# Patient Record
Sex: MALE | Race: WHITE | NOT HISPANIC OR LATINO | Employment: FULL TIME | URBAN - METROPOLITAN AREA
[De-identification: names, ages, dates, MRNs, and addresses within clinical notes are randomized per-mention and may not be internally consistent; named-entity substitution may affect disease eponyms.]

---

## 2019-09-02 ENCOUNTER — HOSPITAL ENCOUNTER (EMERGENCY)
Facility: HOSPITAL | Age: 24
Discharge: LEFT BEFORE DISCHARGE | End: 2019-09-03
Attending: EMERGENCY MEDICINE
Payer: COMMERCIAL

## 2019-09-02 DIAGNOSIS — F11.11 H/O OPIOID ABUSE (CMS/HCC): Primary | ICD-10-CM

## 2019-09-02 LAB
ALBUMIN SERPL-MCNC: 3.3 G/DL (ref 3.4–5)
ALP SERPL-CCNC: 89 IU/L (ref 35–126)
ALT SERPL-CCNC: 27 IU/L (ref 16–63)
ANION GAP SERPL CALC-SCNC: 5 MEQ/L (ref 3–15)
APAP SERPL-MCNC: <10 UG/ML (ref 10–30)
AST SERPL-CCNC: 26 IU/L (ref 15–41)
BASOPHILS # BLD: 0.02 K/UL (ref 0.01–0.1)
BASOPHILS NFR BLD: 0.2 %
BILIRUB SERPL-MCNC: 0.3 MG/DL (ref 0.3–1.2)
BUN SERPL-MCNC: 15 MG/DL (ref 8–20)
CALCIUM SERPL-MCNC: 8.5 MG/DL (ref 8.9–10.3)
CHLORIDE SERPL-SCNC: 101 MEQ/L (ref 98–109)
CO2 SERPL-SCNC: 31 MEQ/L (ref 22–32)
CREAT SERPL-MCNC: 0.9 MG/DL
DIFFERENTIAL METHOD BLD: NORMAL
EOSINOPHIL # BLD: 0.29 K/UL (ref 0.04–0.54)
EOSINOPHIL NFR BLD: 3.3 %
ERYTHROCYTE [DISTWIDTH] IN BLOOD BY AUTOMATED COUNT: 13.4 % (ref 11.6–14.4)
ETHANOL SERPL-MCNC: <5 MG/DL
GFR SERPL CREATININE-BSD FRML MDRD: >60 ML/MIN/1.73M*2
GLUCOSE SERPL-MCNC: 144 MG/DL (ref 70–99)
HCT VFR BLDCO AUTO: 34.4 %
HGB BLD-MCNC: 10.5 G/DL
IMM GRANULOCYTES # BLD AUTO: 0.02 K/UL (ref 0–0.08)
IMM GRANULOCYTES NFR BLD AUTO: 0.2 %
LYMPHOCYTES # BLD: 1.8 K/UL (ref 1.2–3.5)
LYMPHOCYTES NFR BLD: 20.5 %
MCH RBC QN AUTO: 26.3 PG (ref 28–33.2)
MCHC RBC AUTO-ENTMCNC: 30.5 G/DL (ref 32.2–36.5)
MCV RBC AUTO: 86.2 FL (ref 83–98)
MONOCYTES # BLD: 0.7 K/UL (ref 0.3–1)
MONOCYTES NFR BLD: 8 %
NEUTROPHILS # BLD: 5.94 K/UL (ref 1.7–7)
NEUTS SEG NFR BLD: 67.8 %
NRBC BLD-RTO: 0 %
PDW BLD AUTO: 9 FL (ref 9.4–12.4)
PLATELET # BLD AUTO: 255 K/UL
POTASSIUM SERPL-SCNC: 3.7 MEQ/L (ref 3.6–5.1)
PROT SERPL-MCNC: 7.2 G/DL (ref 6–8.2)
RBC # BLD AUTO: 3.99 M/UL (ref 4.5–5.8)
SALICYLATES SERPL-MCNC: <4 MG/DL
SODIUM SERPL-SCNC: 137 MEQ/L (ref 136–144)
WBC # BLD AUTO: 8.77 K/UL

## 2019-09-02 PROCEDURE — 85025 COMPLETE CBC W/AUTO DIFF WBC: CPT | Performed by: EMERGENCY MEDICINE

## 2019-09-02 PROCEDURE — 93005 ELECTROCARDIOGRAM TRACING: CPT | Performed by: EMERGENCY MEDICINE

## 2019-09-02 PROCEDURE — 99284 EMERGENCY DEPT VISIT MOD MDM: CPT | Mod: 25

## 2019-09-02 PROCEDURE — 80053 COMPREHEN METABOLIC PANEL: CPT | Performed by: EMERGENCY MEDICINE

## 2019-09-02 PROCEDURE — 36415 COLL VENOUS BLD VENIPUNCTURE: CPT | Performed by: EMERGENCY MEDICINE

## 2019-09-02 PROCEDURE — G0480 DRUG TEST DEF 1-7 CLASSES: HCPCS | Performed by: EMERGENCY MEDICINE

## 2019-09-03 VITALS
TEMPERATURE: 98.6 F | DIASTOLIC BLOOD PRESSURE: 58 MMHG | WEIGHT: 150.9 LBS | HEIGHT: 70 IN | BODY MASS INDEX: 21.6 KG/M2 | RESPIRATION RATE: 15 BRPM | SYSTOLIC BLOOD PRESSURE: 115 MMHG | OXYGEN SATURATION: 98 % | HEART RATE: 60 BPM

## 2019-09-03 LAB
AMPHET UR QL SCN: NORMAL
ATRIAL RATE: 48
BACTERIA URNS QL MICRO: NORMAL /HPF
BARBITURATES UR QL SCN: NORMAL
BENZODIAZ UR QL SCN: NORMAL
BILIRUB UR QL STRIP.AUTO: NEGATIVE MG/DL
CANNABINOIDS UR QL SCN: NORMAL
CLARITY UR REFRACT.AUTO: ABNORMAL
COCAINE UR QL SCN: NORMAL
COLOR UR AUTO: YELLOW
GLUCOSE UR STRIP.AUTO-MCNC: NEGATIVE MG/DL
HGB UR QL STRIP.AUTO: NEGATIVE
HYALINE CASTS #/AREA URNS LPF: NORMAL /LPF
KETONES UR STRIP.AUTO-MCNC: NEGATIVE MG/DL
LEUKOCYTE ESTERASE UR QL STRIP.AUTO: NEGATIVE
NITRITE UR QL STRIP.AUTO: NEGATIVE
OPIATES UR QL SCN: NORMAL
P AXIS: 38
PCP UR QL SCN: NORMAL
PH UR STRIP.AUTO: 7 [PH]
PR INTERVAL: 158
PROT UR QL STRIP.AUTO: NEGATIVE
QRS DURATION: 90
QT INTERVAL: 480
QTC CALCULATION(BAZETT): 428
R AXIS: 24
RBC #/AREA URNS HPF: NORMAL /HPF
SP GR UR REFRACT.AUTO: 1.01
SQUAMOUS URNS QL MICRO: NORMAL /HPF
T WAVE AXIS: 9
UROBILINOGEN UR STRIP-ACNC: 0.2 EU/DL
VENTRICULAR RATE: 48
WBC #/AREA URNS HPF: NORMAL /HPF

## 2019-09-03 PROCEDURE — 80307 DRUG TEST PRSMV CHEM ANLYZR: CPT | Performed by: EMERGENCY MEDICINE

## 2019-09-03 PROCEDURE — 81001 URINALYSIS AUTO W/SCOPE: CPT | Mod: 59 | Performed by: EMERGENCY MEDICINE

## 2019-09-03 PROCEDURE — 93010 ELECTROCARDIOGRAM REPORT: CPT | Performed by: INTERNAL MEDICINE

## 2019-09-03 NOTE — ED PROVIDER NOTES
"HPI     Chief Complaint   Patient presents with   • Withdrawal     pt states \"I use heroin and I'm trying to quit\"> Last use 4-5 hours ago.     Pt is a 24 year old male who presents to the ED requesting rehab.   Pt has hx of heroin abuse.   States he last used 4 hours ago by means of injection.   Pt would like to go to [facility].   Denies CP, SOB, nausea, or vomiting.   Pt denies alcohol use.   Pt has been to rehab in the past.     HPI     Patient History     History reviewed. No pertinent past medical history.    History reviewed. No pertinent surgical history.    History reviewed. No pertinent family history.    Social History   Substance Use Topics   • Smoking status: Smoker, Current Status Unknown   • Smokeless tobacco: Never Used   • Alcohol use Yes       Systems Reviewed from Nursing Triage:          Review of Systems     Review of Systems   Respiratory: Negative for chest tightness, shortness of breath and wheezing.    Cardiovascular: Negative for chest pain.   Gastrointestinal: Negative for abdominal pain, nausea and vomiting.   Neurological: Negative for dizziness.   Psychiatric/Behavioral: Negative for self-injury and suicidal ideas. The patient is nervous/anxious.         Physical Exam     ED Triage Vitals   Temp Heart Rate Resp BP SpO2   09/02/19 2117 09/02/19 2117 09/02/19 2117 09/02/19 2117 09/02/19 2117   37 °C (98.6 °F) (!) 58 16 129/68 100 %      Temp Source Heart Rate Source Patient Position BP Location FiO2 (%) (Set)   09/02/19 2117 09/02/19 2332 09/02/19 2117 09/02/19 2117 --   Oral Monitor Sitting Right upper arm        Pulse Ox %: 100 % (09/02/19 2153)  Pulse Ox Interpretation: Normal (09/02/19 2153)  Heart Rate: 58 (09/02/19 2153)  Rhythm Strip Interpretation: Sinus Bradycardia (09/02/19 2153)    Patient Vitals for the past 24 hrs:   BP Temp Temp src Pulse Resp SpO2 Height Weight   09/03/19 0028 - - - 60 - - - -   09/02/19 2332 (!) 115/58 - - 60 15 98 % - -   09/02/19 2117 129/68 37 °C (98.6 °F) " "Oral (!) 58 16 100 % 1.778 m (5' 10\") 68.4 kg (150 lb 14.4 oz)           Physical Exam   Constitutional: He is oriented to person, place, and time. He is cooperative. He is easily aroused.  Non-toxic appearance. He does not have a sickly appearance. He does not appear ill. No distress. He is not intubated.   HENT:   Head: Normocephalic and atraumatic.   Mouth/Throat: Oropharynx is clear and moist and mucous membranes are normal.   Cardiovascular: Normal rate, regular rhythm and normal heart sounds.    Pulmonary/Chest: Effort normal and breath sounds normal. No accessory muscle usage. No apnea, no tachypnea and no bradypnea. He is not intubated. No respiratory distress.   Abdominal: Soft. Normal appearance. There is no tenderness.   Neurological: He is alert, oriented to person, place, and time and easily aroused. GCS eye subscore is 4. GCS verbal subscore is 5. GCS motor subscore is 6.            Procedures    ED Course & MDM     Labs Reviewed   COMPREHENSIVE METABOLIC PANEL - Abnormal        Result Value    Sodium 137      Potassium 3.7      Chloride 101      CO2 31      BUN 15      Creatinine 0.9      Glucose 144 (*)     Calcium 8.5 (*)     AST (SGOT) 26      ALT (SGPT) 27      Alkaline Phosphatase 89      Total Protein 7.2      Albumin 3.3 (*)     Bilirubin, Total 0.3      eGFR >60.0      Anion Gap 5     ER TOXICOLOGY SCR, SERUM - Abnormal     Salicylate <4.0      Acetaminophen <10.0 (*)     Ethanol <5     CBC - Abnormal     WBC 8.77      RBC 3.99 (*)     Hemoglobin 10.5 (*)     Hematocrit 34.4 (*)     MCV 86.2      MCH 26.3 (*)     MCHC 30.5 (*)     RDW 13.4      Platelets 255      MPV 9.0 (*)    UA REFLEX CULTURE (MACROSCOPIC) - Abnormal     Color, Urine Yellow      Clarity, Urine Cloudy (*)     Specific Gravity, Urine 1.014      pH, Urine 7.0      Leukocyte Esterase Negative      Nitrite, Urine Negative      Protein, Urine Negative      Glucose, Urine Negative      Ketones, Urine Negative      Urobilinogen, " Urine 0.2      Bilirubin, Urine Negative      Blood, Urine Negative     DRUG SCREEN PANEL, URINE - Normal    PCP Scrn, Ur None Detected      Benzodiazepine Ur Qual None Detected      Cocaine Screen, Urine None Detected      Amphetamine+Methamphetamine Screen, Ur None Detected      Cannabinoid Screen, Urine None Detected      Opiate Scrn, Ur None Detected      Barbiturate Screen, Ur None Detected     UA MICROSCOPIC - Normal    RBC, Urine 0 TO 4      WBC, Urine 0 TO 3      Squamous Epithelial None Seen      Hyaline Cast None Seen      Bacteria, Urine None Seen     CBC AND DIFFERENTIAL    Narrative:     The following orders were created for panel order CBC and differential.  Procedure                               Abnormality         Status                     ---------                               -----------         ------                     CBC[[id number]]                           Abnormal            Final result               Diff Count[[id number]]                                        Final result                 Please view results for these tests on the individual orders.   URINALYSIS REFLEX CULTURE    Narrative:     The following orders were created for panel order Urinalysis with Reflex Culture.  Procedure                               Abnormality         Status                     ---------                               -----------         ------                     UA Reflex to Culture (Mac...[[id number]]  Abnormal            Final result               UA Microscopic[[id number]]                Normal              Final result                 Please view results for these tests on the individual orders.   DIFF COUNT    Differential Type Auto      nRBC 0.0      Immature Granulocytes 0.2      Neutrophils 67.8      Lymphocytes 20.5      Monocytes 8.0      Eosinophils 3.3      Basophils 0.2      Immature Granulocytes, Absolute 0.02      Neutrophils, Absolute 5.94      Lymphocytes, Absolute 1.80      Monocytes,  Absolute 0.70      Eosinophils, Absolute 0.29      Basophils, Absolute 0.02         EKG 12 lead   ED Interpretation    Sinus  bradycardia at 40 bpm, normal axis deviation, T wave inversion V1, good R wave progression                        MDM  Number of Diagnoses or Management Options  Diagnosis management comments: Pt is a 24 year old male who presents to the ED with symptoms as above.   Check labs and cardiac monitor.   Set patient up for rehab            ED Course as of Sep 10 1837   Tue Sep 03, 2019   0207 Labs noted.  Care management involved attempting to place patient into rehab.  Nursing staff made myself aware that patient left prior to treatment complete.  [EK]      ED Course User Index  [EK] Alla Spicer DO         Clinical Impressions as of Sep 10 1837   H/O opioid abuse      Scribe Attestation  By signing my name below, IIvis, attest that this documentation has been prepared under the direction and in the presence of Alla Spicer DO.  9/3/2019 6:16 AM    Provider Attestation  Alla CASTILLO, personally performed the services described in this documentation, as documented by the scribe in my presence, and it is both accurate and complete.  9/3/2019 6:16 AM           Ivis Vo  09/02/19 2154       Alla Spicer DO  09/03/19 0616       Alla Spicer DO  09/10/19 1837

## 2019-09-10 ASSESSMENT — ENCOUNTER SYMPTOMS
CHEST TIGHTNESS: 0
VOMITING: 0
NAUSEA: 0
WHEEZING: 0
NERVOUS/ANXIOUS: 1
SHORTNESS OF BREATH: 0
DIZZINESS: 0
ABDOMINAL PAIN: 0

## 2023-01-23 ENCOUNTER — OFFICE VISIT (OUTPATIENT)
Dept: FAMILY MEDICINE CLINIC | Facility: CLINIC | Age: 28
End: 2023-01-23

## 2023-01-23 VITALS
OXYGEN SATURATION: 97 % | TEMPERATURE: 98.4 F | WEIGHT: 168 LBS | BODY MASS INDEX: 24.05 KG/M2 | SYSTOLIC BLOOD PRESSURE: 122 MMHG | HEIGHT: 70 IN | HEART RATE: 98 BPM | DIASTOLIC BLOOD PRESSURE: 76 MMHG

## 2023-01-23 DIAGNOSIS — Q61.3 POLYCYSTIC KIDNEY DISEASE: Primary | ICD-10-CM

## 2023-01-23 DIAGNOSIS — F11.90 CHRONIC, CONTINUOUS USE OF OPIOIDS: ICD-10-CM

## 2023-01-23 DIAGNOSIS — F43.10 PTSD (POST-TRAUMATIC STRESS DISORDER): ICD-10-CM

## 2023-01-23 DIAGNOSIS — B17.10 ACUTE HEPATITIS C VIRUS INFECTION WITHOUT HEPATIC COMA: ICD-10-CM

## 2023-01-23 DIAGNOSIS — F41.0 PANIC ATTACKS: ICD-10-CM

## 2023-01-23 RX ORDER — PAROXETINE HYDROCHLORIDE 20 MG/1
20 TABLET, FILM COATED ORAL DAILY
Qty: 90 TABLET | Refills: 1 | Status: SHIPPED | OUTPATIENT
Start: 2023-01-23

## 2023-01-23 RX ORDER — LORAZEPAM 1 MG/1
1 TABLET ORAL EVERY 8 HOURS PRN
Qty: 30 TABLET | Refills: 2 | Status: SHIPPED | OUTPATIENT
Start: 2023-01-23

## 2023-01-23 RX ORDER — LISINOPRIL AND HYDROCHLOROTHIAZIDE 20; 12.5 MG/1; MG/1
1 TABLET ORAL DAILY
Qty: 90 TABLET | Refills: 3 | Status: SHIPPED | OUTPATIENT
Start: 2023-01-23

## 2023-01-23 NOTE — PROGRESS NOTES
Assessment/Plan:    No problem-specific Assessment & Plan notes found for this encounter  Diagnoses and all orders for this visit:    Polycystic kidney disease  -     lisinopril-hydrochlorothiazide (PRINZIDE,ZESTORETIC) 20-12 5 MG per tablet; Take 1 tablet by mouth daily  -     CBC and differential; Future  -     Comprehensive metabolic panel; Future  -     CBC and differential  -     Comprehensive metabolic panel    Acute hepatitis C virus infection without hepatic coma  -     lisinopril-hydrochlorothiazide (PRINZIDE,ZESTORETIC) 20-12 5 MG per tablet; Take 1 tablet by mouth daily  -     CBC and differential; Future  -     Comprehensive metabolic panel; Future  -     CBC and differential  -     Comprehensive metabolic panel    Chronic, continuous use of opioids    Panic attacks  -     PARoxetine (PAXIL) 20 mg tablet; Take 1 tablet (20 mg total) by mouth daily  -     LORazepam (ATIVAN) 1 mg tablet; Take 1 tablet (1 mg total) by mouth every 8 (eight) hours as needed for anxiety    PTSD (post-traumatic stress disorder)          Subjective:   Chief Complaint   Patient presents with   • [facility]        Patient ID: Noelle Carmichael is a 32 y o  male  HPI    The following portions of the patient's history were reviewed and updated as appropriate: allergies, current medications, past family history, past medical history, past social history, past surgical history and problem list     Review of Systems   Constitutional: Negative for fatigue, fever and unexpected weight change  HENT: Negative for congestion, sinus pain and sore throat  Eyes: Negative for visual disturbance  Respiratory: Negative for shortness of breath and wheezing  Cardiovascular: Negative for chest pain and palpitations  Gastrointestinal: Negative for abdominal pain, nausea and vomiting  Musculoskeletal: Negative  Negative for arthralgias and myalgias  Neurological: Negative for syncope, weakness and numbness  Psychiatric/Behavioral: Negative  Negative for confusion, dysphoric mood and suicidal ideas  Objective:  Vitals:    01/23/23 1430   BP: 122/76   BP Location: Right arm   Patient Position: Sitting   Cuff Size: Standard   Pulse: 98   Temp: 98 4 °F (36 9 °C)   TempSrc: Tympanic Core   SpO2: 97%   Weight: 76 2 kg (168 lb)   Height: 5' 10" (1 778 m)      Physical Exam  Constitutional:       Appearance: He is well-developed  HENT:      Right Ear: Ear canal normal  Tympanic membrane is not injected  Left Ear: Ear canal normal  Tympanic membrane is not injected  Nose: Nose normal    Eyes:      General:         Right eye: No discharge  Left eye: No discharge  Conjunctiva/sclera: Conjunctivae normal       Pupils: Pupils are equal, round, and reactive to light  Neck:      Thyroid: No thyromegaly  Cardiovascular:      Rate and Rhythm: Normal rate and regular rhythm  Heart sounds: Normal heart sounds  No murmur heard  Pulmonary:      Effort: Pulmonary effort is normal  No respiratory distress  Breath sounds: Normal breath sounds  No wheezing  Abdominal:      General: Bowel sounds are normal  There is no distension  Palpations: Abdomen is soft  Tenderness: There is no abdominal tenderness  Musculoskeletal:         General: Normal range of motion  Cervical back: Normal range of motion and neck supple  Lymphadenopathy:      Cervical: No cervical adenopathy  Skin:     General: Skin is warm and dry  Neurological:      Mental Status: He is alert and oriented to person, place, and time  He is not disoriented  Sensory: No sensory deficit  Gait: Gait normal       Deep Tendon Reflexes: Reflexes are normal and symmetric  Psychiatric:         Speech: Speech normal          Behavior: Behavior normal          Thought Content:  Thought content normal          Judgment: Judgment normal

## 2023-02-22 ENCOUNTER — TELEPHONE (OUTPATIENT)
Dept: OTHER | Facility: OTHER | Age: 28
End: 2023-02-22

## 2023-02-22 NOTE — TELEPHONE ENCOUNTER
Patient is calling to reschedule his appt for tomorrow he is   CANCELLING-2/22/23 @11:25 VIRTUAL VISIT WITH DR Kami Chadwick    RESCHEDULE FOR 03/16/23 @ 11AM VIRTUAL VISIT WITH DR Kami Chadwick

## 2023-02-28 ENCOUNTER — TELEMEDICINE (OUTPATIENT)
Dept: FAMILY MEDICINE CLINIC | Facility: CLINIC | Age: 28
End: 2023-02-28

## 2023-02-28 DIAGNOSIS — F41.0 PANIC ATTACKS: ICD-10-CM

## 2023-02-28 RX ORDER — LORAZEPAM 1 MG/1
1 TABLET ORAL EVERY 8 HOURS PRN
Qty: 60 TABLET | Refills: 2 | Status: SHIPPED | OUTPATIENT
Start: 2023-02-28

## 2023-02-28 RX ORDER — BUPRENORPHINE HYDROCHLORIDE AND NALOXONE HYDROCHLORIDE DIHYDRATE 2; .5 MG/1; MG/1
16 TABLET SUBLINGUAL
COMMUNITY

## 2023-02-28 NOTE — PROGRESS NOTES
Virtual Regular Visit    Verification of patient location:    Patient is located in the following state in which I hold an active license [place]      Assessment/Plan:    Problem List Items Addressed This Visit    None  Visit Diagnoses     Panic attacks        Relevant Medications    LORazepam (ATIVAN) 1 mg tablet               Reason for visit is   Chief Complaint   Patient presents with   • Follow-up     Med check   • Physical Exam        Encounter provider Ariana Vivar MD    Provider located at [address] [place] [address]      Recent Visits  No visits were found meeting these conditions  Showing recent visits within past 7 days and meeting all other requirements  Today's Visits  Date Type Provider Dept   02/28/23 Telemedicine Ariana Vivar MD [facility] [address] today's visits and meeting all other requirements  Future Appointments  No visits were found meeting these conditions  Showing future appointments within next 150 days and meeting all other requirements       The patient was identified by name and date of birth  Freddie Araiza was informed that this is a telemedicine visit and that the visit is being conducted through the [address] [place] [address] Road [employer] platform  He agrees to proceed     My office door was closed  No one else was in the room  He acknowledged consent and understanding of privacy and security of the video platform  The patient has agreed to participate and understands they can discontinue the visit at any time  Patient is aware this is a billable service  Subjective  Freddie Araiza is a 29 y o  male f/u anxiety   HPI     Past Medical History:   Diagnosis Date   • Chronic kidney disease    • Hypertension        History reviewed  No pertinent surgical history      Current Outpatient Medications   Medication Sig Dispense Refill   • lisinopril-hydrochlorothiazide (PRINZIDE,ZESTORETIC) 20-12 5 MG per tablet Take 1 tablet by mouth daily 90 tablet 3   • LORazepam (ATIVAN) 1 mg tablet Take 1 tablet (1 mg total) by mouth every 8 (eight) hours as needed for anxiety 60 tablet 2   • PARoxetine (PAXIL) 20 mg tablet Take 1 tablet (20 mg total) by mouth daily 90 tablet 1   • buprenorphine-naloxone (SUBOXONE) 2-0 5 mg per SL tablet Place 16 tablets under the tongue       No current facility-administered medications for this visit  No Known Allergies    Review of Systems   Constitutional: Negative for fatigue, fever and unexpected weight change  HENT: Negative for congestion, sinus pain and sore throat  Eyes: Negative for visual disturbance  Respiratory: Negative for shortness of breath and wheezing  Cardiovascular: Negative for chest pain and palpitations  Gastrointestinal: Negative for abdominal pain, nausea and vomiting  Musculoskeletal: Negative  Negative for arthralgias and myalgias  Neurological: Negative for syncope, weakness and numbness  Psychiatric/Behavioral: Positive for decreased concentration  Negative for confusion, dysphoric mood and suicidal ideas  The patient is nervous/anxious  Video Exam    There were no vitals filed for this visit  Physical Exam  Cardiovascular:      Pulses: Normal pulses            I spent 15 minutes directly with the patient during this visit

## 2023-03-04 DIAGNOSIS — F41.0 PANIC ATTACKS: ICD-10-CM

## 2023-03-06 RX ORDER — PAROXETINE HYDROCHLORIDE 20 MG/1
20 TABLET, FILM COATED ORAL DAILY
Qty: 90 TABLET | Refills: 0 | Status: SHIPPED | OUTPATIENT
Start: 2023-03-06

## 2023-03-28 DIAGNOSIS — I10 PRIMARY HYPERTENSION: Primary | ICD-10-CM

## 2023-03-28 RX ORDER — VALSARTAN AND HYDROCHLOROTHIAZIDE 160; 25 MG/1; MG/1
1 TABLET ORAL DAILY
Qty: 90 TABLET | Refills: 3 | Status: SHIPPED | OUTPATIENT
Start: 2023-03-28

## 2023-04-25 DIAGNOSIS — F41.0 PANIC ATTACKS: ICD-10-CM

## 2023-04-25 RX ORDER — LORAZEPAM 1 MG/1
1 TABLET ORAL EVERY 8 HOURS PRN
Qty: 60 TABLET | Refills: 0 | Status: CANCELLED | OUTPATIENT
Start: 2023-04-25

## 2023-05-04 ENCOUNTER — OFFICE VISIT (OUTPATIENT)
Dept: FAMILY MEDICINE CLINIC | Facility: CLINIC | Age: 28
End: 2023-05-04

## 2023-05-04 VITALS
TEMPERATURE: 97.2 F | HEIGHT: 70 IN | WEIGHT: 167 LBS | DIASTOLIC BLOOD PRESSURE: 78 MMHG | SYSTOLIC BLOOD PRESSURE: 100 MMHG | OXYGEN SATURATION: 99 % | BODY MASS INDEX: 23.91 KG/M2 | HEART RATE: 82 BPM

## 2023-05-04 DIAGNOSIS — I10 PRIMARY HYPERTENSION: ICD-10-CM

## 2023-05-04 DIAGNOSIS — F41.0 PANIC ATTACKS: ICD-10-CM

## 2023-05-04 DIAGNOSIS — Z00.00 WELLNESS EXAMINATION: Primary | ICD-10-CM

## 2023-05-04 DIAGNOSIS — R79.89 ELEVATED LFTS: ICD-10-CM

## 2023-05-04 DIAGNOSIS — N30.00 ACUTE CYSTITIS WITHOUT HEMATURIA: ICD-10-CM

## 2023-05-04 RX ORDER — LORAZEPAM 1 MG/1
1 TABLET ORAL EVERY 6 HOURS PRN
Qty: 120 TABLET | Refills: 5 | Status: SHIPPED | OUTPATIENT
Start: 2023-05-04

## 2023-05-04 NOTE — PROGRESS NOTES
HPI:  Kenny Tineo is a 29 y o  male here for his yearly health maintenance exam    Patient Active Problem List   Diagnosis    Panic attacks    Hypertension    Elevated LFTs     Past Medical History:   Diagnosis Date    Chronic kidney disease     Hypertension        1  Advanced Directive: n     2  Durable Power of  for Healthcare: n     3  Social History:           Drug and alcohol History: y                  4  Immunizations up to date: y                 Lifestyle:                           Healthy Diet:y                          Alcohol Use:n                          Tobacco Use:n                          Regular exercise:y                          Weight concerns:n                               5  Over the past 2 weeks, how often have you been bothered by the following:              Little interest or pleasure in doing things:n              Felling down, depressed or hopeless:n       Current Outpatient Medications   Medication Sig Dispense Refill    buprenorphine-naloxone (SUBOXONE) 2-0 5 mg per SL tablet Place 16 tablets under the tongue      LORazepam (ATIVAN) 1 mg tablet Take 1 tablet (1 mg total) by mouth every 8 (eight) hours as needed for anxiety 60 tablet 5    PARoxetine (PAXIL) 20 mg tablet Take 1 tablet (20 mg total) by mouth daily 90 tablet 0    valsartan-hydrochlorothiazide (DIOVAN-HCT) 160-25 MG per tablet Take 1 tablet by mouth daily 90 tablet 3     No current facility-administered medications for this visit  No Known Allergies    There is no immunization history on file for this patient  Patient Care Team:  Soha Tripathi MD as PCP - General (Family Medicine)  Soha Tripathi MD as PCP - [facility]CP-[facility])    Review of Systems   Constitutional: Negative for fatigue, fever and unexpected weight change  HENT: Negative for congestion, sinus pain and sore throat  Eyes: Negative for visual disturbance     Respiratory: Negative for shortness of breath and wheezing  Cardiovascular: Negative for chest pain and palpitations  Gastrointestinal: Negative for abdominal pain, nausea and vomiting  Musculoskeletal: Negative  Negative for arthralgias and myalgias  Neurological: Negative for syncope, weakness and numbness  Psychiatric/Behavioral: Negative  Negative for confusion, dysphoric mood and suicidal ideas  Physical Exam :  Physical Exam  Constitutional:       Appearance: He is well-developed  HENT:      Right Ear: Ear canal normal  Tympanic membrane is not injected  Left Ear: Ear canal normal  Tympanic membrane is not injected  Nose: Nose normal    Eyes:      General:         Right eye: No discharge  Left eye: No discharge  Conjunctiva/sclera: Conjunctivae normal       Pupils: Pupils are equal, round, and reactive to light  Neck:      Thyroid: No thyromegaly  Cardiovascular:      Rate and Rhythm: Normal rate and regular rhythm  Heart sounds: Normal heart sounds  No murmur heard  Pulmonary:      Effort: Pulmonary effort is normal  No respiratory distress  Breath sounds: Normal breath sounds  No wheezing  Abdominal:      General: Bowel sounds are normal  There is no distension  Palpations: Abdomen is soft  Tenderness: There is no abdominal tenderness  Musculoskeletal:         General: Normal range of motion  Cervical back: Normal range of motion and neck supple  Lymphadenopathy:      Cervical: No cervical adenopathy  Skin:     General: Skin is warm and dry  Neurological:      Mental Status: He is alert and oriented to person, place, and time  He is not disoriented  Sensory: No sensory deficit  Gait: Gait normal       Deep Tendon Reflexes: Reflexes are normal and symmetric  Psychiatric:         Speech: Speech normal          Behavior: Behavior normal          Thought Content: Thought content normal          Judgment: Judgment normal            Assessment and Plan:  1   Wellness examination        2  Panic attacks        3  Primary hypertension        4   Elevated LFTs  Comprehensive metabolic panel    Hepatitis C Ab W/Refl To HCV RNA, Qn, PCR    Comprehensive metabolic panel    Hepatitis C Ab W/Refl To HCV RNA, Qn, PCR          Health Maintenance Due   Topic Date Due    HIV Screening  Never done    Annual Physical  Never done    COVID-19 Vaccine (2 - Pfizer series) 04/07/2022    Hepatitis B Vaccine (2 of 3 - Hep B Twinrix risk 3-dose series) 04/14/2022    Hepatitis A Vaccine (2 of 3 - Hep A Twinrix risk 3-dose series) 04/14/2022

## 2023-05-04 NOTE — PROGRESS NOTES
Name: Tia Messina      : 1995      MRN: [id number]  Encounter Provider: Shahnaz Bingham MD  Encounter Date: 2023   Encounter department: [facility]     1  Wellness examination    2  Panic attacks    3  Primary hypertension    4  Elevated LFTs  -     Comprehensive metabolic panel; Future  -     Hepatitis C Ab W/Refl To HCV RNA, Qn, PCR; Future  -     Comprehensive metabolic panel  -     Hepatitis C Ab W/Refl To HCV RNA, Qn, PCR           Subjective      HPI  Review of Systems   Constitutional: Negative for appetite change and fatigue  HENT: Negative for ear pain, postnasal drip, sinus pressure and sore throat  Eyes: Negative for redness and visual disturbance  Respiratory: Negative for cough, chest tightness, shortness of breath and wheezing  Cardiovascular: Negative for chest pain, palpitations and leg swelling  Gastrointestinal: Negative for abdominal pain, blood in stool, constipation, diarrhea, nausea and vomiting  Genitourinary: Negative for difficulty urinating, flank pain, hematuria and urgency  Musculoskeletal: Negative for arthralgias, back pain, joint swelling and myalgias  Skin: Negative for rash and wound  No suspicious skin lesions   Neurological: Negative for light-headedness, numbness and headaches  Psychiatric/Behavioral: Negative for confusion, decreased concentration, sleep disturbance and suicidal ideas  The patient is not nervous/anxious          Current Outpatient Medications on File Prior to Visit   Medication Sig    buprenorphine-naloxone (SUBOXONE) 2-0 5 mg per SL tablet Place 16 tablets under the tongue    LORazepam (ATIVAN) 1 mg tablet Take 1 tablet (1 mg total) by mouth every 8 (eight) hours as needed for anxiety    PARoxetine (PAXIL) 20 mg tablet Take 1 tablet (20 mg total) by mouth daily    valsartan-hydrochlorothiazide (DIOVAN-HCT) 160-25 MG per tablet Take 1 tablet by mouth daily "      Objective     /78 (BP Location: Left arm, Patient Position: Sitting, Cuff Size: Standard)   Pulse 82   Temp (!) 97 2 °F (36 2 °C) (Tympanic)   Ht 5' 10\" (1 778 m)   Wt 75 8 kg (167 lb)   SpO2 99%   BMI 23 96 kg/m²     Physical Exam  Constitutional:       General: He is not in acute distress  Appearance: He is well-developed  He is not diaphoretic  HENT:      Right Ear: Ear canal normal  Tympanic membrane is not injected  Left Ear: Ear canal normal  Tympanic membrane is not injected  Nose: Nose normal       Mouth/Throat:      Pharynx: No oropharyngeal exudate  Eyes:      Conjunctiva/sclera: Conjunctivae normal       Pupils: Pupils are equal, round, and reactive to light  Neck:      Thyroid: No thyromegaly  Cardiovascular:      Rate and Rhythm: Normal rate and regular rhythm  Heart sounds: Normal heart sounds  No murmur heard  Pulmonary:      Effort: Pulmonary effort is normal  No respiratory distress  Breath sounds: Normal breath sounds  No wheezing  Abdominal:      General: Bowel sounds are normal       Palpations: Abdomen is soft  There is no hepatomegaly, splenomegaly or mass  Tenderness: There is no abdominal tenderness  Musculoskeletal:         General: No tenderness or deformity  Normal range of motion  Cervical back: Normal range of motion and neck supple  Skin:     General: Skin is warm and dry  Coloration: Skin is not pale  Findings: No rash  Neurological:      Mental Status: He is alert and oriented to person, place, and time  He is not disoriented  Sensory: No sensory deficit        Gait: Gait normal    Psychiatric:         Speech: Speech normal          Behavior: Behavior normal        Trent Bro MD  "

## 2023-06-21 DIAGNOSIS — F41.0 PANIC ATTACKS: ICD-10-CM

## 2023-06-21 RX ORDER — LORAZEPAM 1 MG/1
1 TABLET ORAL EVERY 6 HOURS PRN
Qty: 120 TABLET | Refills: 5 | Status: SHIPPED | OUTPATIENT
Start: 2023-06-21 | End: 2023-06-21 | Stop reason: SDUPTHER

## 2023-06-21 RX ORDER — PAROXETINE HYDROCHLORIDE 20 MG/1
20 TABLET, FILM COATED ORAL DAILY
Qty: 90 TABLET | Refills: 0 | Status: SHIPPED | OUTPATIENT
Start: 2023-06-21 | End: 2023-06-21 | Stop reason: SDUPTHER

## 2023-06-21 RX ORDER — PAROXETINE HYDROCHLORIDE 20 MG/1
20 TABLET, FILM COATED ORAL DAILY
Qty: 90 TABLET | Refills: 0 | Status: SHIPPED | OUTPATIENT
Start: 2023-06-21

## 2023-06-21 RX ORDER — LORAZEPAM 1 MG/1
1 TABLET ORAL EVERY 6 HOURS PRN
Qty: 120 TABLET | Refills: 5 | Status: SHIPPED | OUTPATIENT
Start: 2023-06-21

## 2023-07-14 ENCOUNTER — PATIENT MESSAGE (OUTPATIENT)
Dept: FAMILY MEDICINE CLINIC | Facility: CLINIC | Age: 28
End: 2023-07-14

## 2023-07-25 DIAGNOSIS — F41.9 ANXIETY: Primary | ICD-10-CM

## 2023-07-25 RX ORDER — DIAZEPAM 5 MG/1
5 TABLET ORAL EVERY 8 HOURS PRN
Qty: 60 TABLET | Refills: 3 | Status: SHIPPED | OUTPATIENT
Start: 2023-07-25 | End: 2023-07-26 | Stop reason: SDUPTHER

## 2023-07-26 DIAGNOSIS — F41.9 ANXIETY: ICD-10-CM

## 2023-07-26 RX ORDER — DIAZEPAM 5 MG/1
5 TABLET ORAL EVERY 8 HOURS PRN
Qty: 60 TABLET | Refills: 3 | Status: SHIPPED | OUTPATIENT
Start: 2023-07-26

## 2023-07-27 ENCOUNTER — TELEPHONE (OUTPATIENT)
Dept: FAMILY MEDICINE CLINIC | Facility: CLINIC | Age: 28
End: 2023-07-27

## 2023-07-27 NOTE — TELEPHONE ENCOUNTER
Per Dr. Elly Auguste -- message left for patient to please stop using Dr. Luna Speak personal cell phone to call and text him directly and to use the main office # @ [phone] only. The message is in regard to a text message where the patient is asking if he disposes of the Ativan can he get the Valium. Per Dr. Elly Auguste, only if the pharmacist wants to dispose of the Ativan and fill the Valium, otherwise he is to use the Ativan until the date he is eligible for a refill which will be 8/18/23 (he filled the Ativan on 7/19/23). The pharmacy already has the Valium Rx on file.

## 2023-09-13 DIAGNOSIS — F41.9 ANXIETY: ICD-10-CM

## 2023-09-14 RX ORDER — DIAZEPAM 5 MG/1
5 TABLET ORAL EVERY 8 HOURS PRN
Qty: 60 TABLET | Refills: 0 | Status: SHIPPED | OUTPATIENT
Start: 2023-09-14

## 2023-11-10 DIAGNOSIS — F41.9 ANXIETY: Primary | ICD-10-CM

## 2023-11-10 RX ORDER — LORAZEPAM 1 MG/1
1 TABLET ORAL EVERY 8 HOURS PRN
Qty: 90 TABLET | Refills: 5 | Status: SHIPPED | OUTPATIENT
Start: 2023-11-10

## 2023-11-13 ENCOUNTER — TELEPHONE (OUTPATIENT)
Dept: FAMILY MEDICINE CLINIC | Facility: CLINIC | Age: 28
End: 2023-11-13

## 2023-11-13 NOTE — TELEPHONE ENCOUNTER
Patient calls in, states that we needed to call pharmacy for clarification for Ativan. Spoke with pharmacy, they were confused on why patient was now going to be getting ativan rx'd instead of the diazepam. I informed pharmacy that the diazepam was a trial and he would be returning to ativan. Pharmacy will fill on 10/15/2023 as patient received 20 day supplies of Diazepam on 10/26/2023.   Patient aware

## 2023-11-20 DIAGNOSIS — F41.9 ANXIETY: ICD-10-CM

## 2023-11-20 RX ORDER — LORAZEPAM 1 MG/1
1 TABLET ORAL EVERY 6 HOURS PRN
Qty: 120 TABLET | Refills: 5 | Status: SHIPPED | OUTPATIENT
Start: 2023-11-20

## 2023-12-12 ENCOUNTER — PATIENT MESSAGE (OUTPATIENT)
Dept: FAMILY MEDICINE CLINIC | Facility: CLINIC | Age: 28
End: 2023-12-12

## 2023-12-12 DIAGNOSIS — I10 PRIMARY HYPERTENSION: ICD-10-CM

## 2023-12-12 DIAGNOSIS — F41.9 ANXIETY: ICD-10-CM

## 2023-12-12 DIAGNOSIS — F41.9 ANXIETY: Primary | ICD-10-CM

## 2023-12-12 RX ORDER — VALSARTAN AND HYDROCHLOROTHIAZIDE 160; 25 MG/1; MG/1
1 TABLET ORAL DAILY
Qty: 90 TABLET | Refills: 3 | Status: SHIPPED | OUTPATIENT
Start: 2023-12-12

## 2023-12-12 RX ORDER — LORAZEPAM 1 MG/1
1 TABLET ORAL EVERY 6 HOURS PRN
Qty: 120 TABLET | Refills: 5 | Status: SHIPPED | OUTPATIENT
Start: 2023-12-12

## 2023-12-26 ENCOUNTER — TELEMEDICINE (OUTPATIENT)
Dept: FAMILY MEDICINE CLINIC | Facility: CLINIC | Age: 28
End: 2023-12-26
Payer: COMMERCIAL

## 2023-12-26 ENCOUNTER — TELEPHONE (OUTPATIENT)
Dept: FAMILY MEDICINE CLINIC | Facility: CLINIC | Age: 28
End: 2023-12-26

## 2023-12-26 DIAGNOSIS — F41.0 PANIC ATTACKS: Primary | ICD-10-CM

## 2023-12-26 PROCEDURE — 99213 OFFICE O/P EST LOW 20 MIN: CPT | Performed by: FAMILY MEDICINE

## 2023-12-26 RX ORDER — ALPRAZOLAM 1 MG/1
1 TABLET ORAL 3 TIMES DAILY PRN
Qty: 90 TABLET | Refills: 2 | Status: SHIPPED | OUTPATIENT
Start: 2023-12-26

## 2023-12-26 RX ORDER — BUPROPION HYDROCHLORIDE 150 MG/1
150 TABLET ORAL EVERY MORNING
Qty: 90 TABLET | Refills: 3 | Status: SHIPPED | OUTPATIENT
Start: 2023-12-26 | End: 2024-12-20

## 2023-12-26 NOTE — TELEPHONE ENCOUNTER
LARRY FROM Pharmacy left a VM  STATES THE PT JUST  HIS ATIVAN 1 MG DEC 12 ,AND DR PRESCRIBE XANAX  ON 12/26 PHARMACY WANT TO KNOW IF IS OKAY FILLING XANAX,   I CALL PHARMACY AND SPOKE WITH LARRY GIVING THE  OK PER YONNY STATES PT IS AWARE TO CHANGE THE MEDICATION TO XANAX 1 MG.

## 2023-12-26 NOTE — PROGRESS NOTES
Virtual Regular Visit    Verification of patient location:    Patient is located at Home in the following state in which I hold an active license [place]      Assessment/Plan:    Problem List Items Addressed This Visit       Panic attacks - Primary         Depression Screening and Follow-up Plan: Patient was screened for depression during today's encounter. They screened negative with a PHQ-2 score of 0.        Reason for visit is   Chief Complaint   Patient presents with    med check        Encounter provider Dhaval Lentz MD    Provider located at [address] [address]      Recent Visits  No visits were found meeting these conditions.  Showing recent visits within past 7 days and meeting all other requirements  Today's Visits  Date Type Provider Dept   12/26/23 Telemedicine Dhaval Lentz MD [facility]   Showing today's visits and meeting all other requirements  Future Appointments  No visits were found meeting these conditions.  Showing future appointments within next 150 days and meeting all other requirements       The patient was identified by name and date of birth. Teo Ward was informed that this is a telemedicine visit and that the visit is being conducted through the Epic Embedded platform. He agrees to proceed..  My office door was closed. No one else was in the room.  He acknowledged consent and understanding of privacy and security of the video platform. The patient has agreed to participate and understands they can discontinue the visit at any time.    Patient is aware this is a billable service.     Subjective  Teo Ward is a 28 y.o. male f/u panic attacks .      HPI     Past Medical History:   Diagnosis Date    Chronic kidney disease     Hypertension        History reviewed. No pertinent surgical history.    Current Outpatient Medications   Medication Sig Dispense Refill     buprenorphine-naloxone (SUBOXONE) 2-0.5 mg per SL tablet Place 16 tablets under the tongue      LORazepam (ATIVAN) 1 mg tablet Take 1 tablet (1 mg total) by mouth every 6 (six) hours as needed for anxiety 120 tablet 5    PARoxetine (PAXIL) 20 mg tablet Take 1 tablet (20 mg total) by mouth daily 90 tablet 0    valsartan-hydrochlorothiazide (DIOVAN-HCT) 160-25 MG per tablet Take 1 tablet by mouth daily 90 tablet 3     No current facility-administered medications for this visit.        No Known Allergies    Review of Systems   Constitutional:  Negative for fatigue, fever and unexpected weight change.   HENT:  Negative for congestion, sinus pain and sore throat.    Eyes:  Negative for visual disturbance.   Respiratory:  Negative for shortness of breath and wheezing.    Cardiovascular:  Negative for chest pain and palpitations.   Gastrointestinal:  Negative for abdominal pain, nausea and vomiting.   Musculoskeletal: Negative.  Negative for arthralgias and myalgias.   Neurological:  Negative for syncope, weakness and numbness.   Psychiatric/Behavioral: Negative.  Negative for confusion, dysphoric mood and suicidal ideas.    Incr panic attacks    Video Exam    There were no vitals filed for this visit.    Physical Exam  Constitutional:       Appearance: Normal appearance.   Pulmonary:      Effort: Pulmonary effort is normal.   Neurological:      General: No focal deficit present.      Mental Status: He is alert.          Visit Time  Total Visit Duration: 15

## 2023-12-29 RX ORDER — ALPRAZOLAM 1 MG/1
1 TABLET ORAL 4 TIMES DAILY PRN
Qty: 120 TABLET | Refills: 2 | Status: SHIPPED | OUTPATIENT
Start: 2023-12-29

## 2024-02-29 ENCOUNTER — TELEPHONE (OUTPATIENT)
Dept: FAMILY MEDICINE CLINIC | Facility: CLINIC | Age: 29
End: 2024-02-29
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
## 2024-03-01 DIAGNOSIS — F41.9 ANXIETY: ICD-10-CM

## 2024-03-01 DIAGNOSIS — I10 PRIMARY HYPERTENSION: ICD-10-CM

## 2024-03-01 RX ORDER — ALPRAZOLAM 1 MG/1
TABLET ORAL
Qty: 24 TABLET | Refills: 0 | Status: SHIPPED | OUTPATIENT
Start: 2024-03-01 | End: 2024-03-01 | Stop reason: SDUPTHER

## 2024-03-01 RX ORDER — ALPRAZOLAM 1 MG/1
TABLET ORAL
Qty: 24 TABLET | Refills: 0 | Status: SHIPPED | OUTPATIENT
Start: 2024-03-01

## 2024-03-02 DIAGNOSIS — F41.9 ANXIETY: ICD-10-CM

## 2024-03-02 RX ORDER — ALPRAZOLAM 1 MG/1
TABLET ORAL
Qty: 24 TABLET | Refills: 0 | Status: CANCELLED | OUTPATIENT
Start: 2024-03-02

## 2024-03-20 DIAGNOSIS — F41.9 ANXIETY: ICD-10-CM

## 2024-03-20 RX ORDER — ALPRAZOLAM 1 MG/1
1 TABLET ORAL 4 TIMES DAILY PRN
Qty: 120 TABLET | Refills: 0 | Status: SHIPPED | OUTPATIENT
Start: 2024-03-20

## 2024-04-02 DIAGNOSIS — F41.9 ANXIETY: ICD-10-CM

## 2024-04-02 RX ORDER — ALPRAZOLAM 1 MG/1
1 TABLET ORAL 4 TIMES DAILY PRN
Qty: 120 TABLET | Refills: 0 | Status: CANCELLED | OUTPATIENT
Start: 2024-04-02

## 2024-04-06 DIAGNOSIS — F41.9 ANXIETY: ICD-10-CM

## 2024-04-06 RX ORDER — ALPRAZOLAM 1 MG/1
1 TABLET ORAL 3 TIMES DAILY PRN
Qty: 90 TABLET | Refills: 3 | Status: SHIPPED | OUTPATIENT
Start: 2024-04-06 | End: 2024-04-12 | Stop reason: SDUPTHER

## 2024-04-12 DIAGNOSIS — F41.9 ANXIETY: ICD-10-CM

## 2024-04-12 RX ORDER — ALPRAZOLAM 1 MG/1
1 TABLET ORAL 3 TIMES DAILY PRN
Qty: 90 TABLET | Refills: 3 | Status: SHIPPED | OUTPATIENT
Start: 2024-04-12

## 2024-04-12 RX ORDER — NALOXONE HYDROCHLORIDE 4 MG/.1ML
SPRAY NASAL
Qty: 1 EACH | Refills: 1 | Status: SHIPPED | OUTPATIENT
Start: 2024-04-12 | End: 2025-04-12

## 2024-04-23 DIAGNOSIS — F41.9 ANXIETY: ICD-10-CM

## 2024-04-23 RX ORDER — ALPRAZOLAM 1 MG/1
1 TABLET ORAL 3 TIMES DAILY PRN
Qty: 90 TABLET | Refills: 0 | OUTPATIENT
Start: 2024-04-23

## 2024-04-23 NOTE — TELEPHONE ENCOUNTER
Patient called again requesting refills on all of his meds. He states he has a lock box now to prevent theft.     He would like a call back as soon as possible regarding time line for refill.     Thank you.    Pt phone     [phone]

## 2024-04-23 NOTE — TELEPHONE ENCOUNTER
"Patient called in stating he hasn't heard anything regarding his medication. He has no medication left and is scared that he doesn't have any left. No same day/next day appointments available. Patient states he may \"go buy them off the street\"    Please call patient for further information/recommendation.   "
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
## 2024-04-24 RX ORDER — ALPRAZOLAM 1 MG/1
1 TABLET ORAL DAILY
Qty: 10 TABLET | Refills: 0 | Status: SHIPPED | OUTPATIENT
Start: 2024-04-24 | End: 2024-05-01
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
## 2024-04-25 ENCOUNTER — TELEPHONE (OUTPATIENT)
Age: 29
End: 2024-04-25

## 2024-04-25 NOTE — TELEPHONE ENCOUNTER
Message left for patient as per JULIA Boston CRNP Lori Kertesz, MA    I sent 10 tabs. He needs to follow up with Dr. Lentz next week.
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
## 2024-04-25 NOTE — TELEPHONE ENCOUNTER
Returned patient phone call to access center regarding his xanax     Patient stated he was only sent 10 tablets and that will not get him by till his next appointment 05/16/2024  because he was told to take 3 a day at 120mg      3  a day at 120mg

## 2024-04-25 NOTE — TELEPHONE ENCOUNTER
Pt had called in, in regards to his prescription Xanax, being sent in for the 10 pills. Pt has the pills, but stated that these wont be enough until his med check up appointment that is scheduled for 05/16/2024.     Pt did confirm he will be showing up for this appointment, but was wondering what this appointment will consist of, and if the prescription for this medication would just be renewed for more pills.     Pt stated that he was told if he was running out of the pills, that he needed to call 2 days before he ran out of the 10 pills, and that they would adjust the prescription.     Please advise back to patient.

## 2024-04-26 NOTE — TELEPHONE ENCOUNTER
Pt aware that he need follow up with dr cabezas, pt states he going run out medication before  may 16,     Pt also mention  he willing to try the clonazepam?    Pt are schedule for virtual olga  on 05/01

## 2024-04-30 ENCOUNTER — TELEPHONE (OUTPATIENT)
Age: 29
End: 2024-04-30

## 2024-04-30 NOTE — TELEPHONE ENCOUNTER
Tomas from [facility] called; is requesting office notes from 12/26/23. Stated she attempted to go through medical records twice. Requested notes from 12/26 visit but received notes from 5/4 visit. Attempted to put through another request on 4/6 and status changed to closed. Can we fax to her attn:     Fax#: [phone]

## 2024-05-01 ENCOUNTER — TELEMEDICINE (OUTPATIENT)
Dept: FAMILY MEDICINE CLINIC | Facility: CLINIC | Age: 29
End: 2024-05-01

## 2024-05-01 DIAGNOSIS — F41.9 ANXIETY: Primary | ICD-10-CM

## 2024-05-01 PROCEDURE — 99213 OFFICE O/P EST LOW 20 MIN: CPT | Performed by: FAMILY MEDICINE

## 2024-05-01 RX ORDER — CLONAZEPAM 2 MG/1
2 TABLET ORAL 2 TIMES DAILY
Qty: 60 TABLET | Refills: 1 | Status: SHIPPED | OUTPATIENT
Start: 2024-05-01
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
## 2024-05-03 ENCOUNTER — TELEPHONE (OUTPATIENT)
Dept: FAMILY MEDICINE CLINIC | Facility: CLINIC | Age: 29
End: 2024-05-03

## 2024-05-03 NOTE — TELEPHONE ENCOUNTER
Called pt, looked at insurance. [employer] is no longer active per Judit. Pt mentioned to me that he does have [place] [employer]. Gave me the new member ID, confirmed it with Pt and the insurance does not seem it wants to process. Told pt, going to call insurance and figure out what is going on. Has member ID but does not have physical card yet.

## 2024-05-16 ENCOUNTER — OFFICE VISIT (OUTPATIENT)
Dept: FAMILY MEDICINE CLINIC | Facility: CLINIC | Age: 29
End: 2024-05-16

## 2024-05-16 VITALS
DIASTOLIC BLOOD PRESSURE: 88 MMHG | BODY MASS INDEX: 25.2 KG/M2 | OXYGEN SATURATION: 99 % | HEART RATE: 84 BPM | HEIGHT: 70 IN | RESPIRATION RATE: 18 BRPM | SYSTOLIC BLOOD PRESSURE: 138 MMHG | WEIGHT: 176 LBS | TEMPERATURE: 96.8 F

## 2024-05-16 DIAGNOSIS — F41.0 PANIC ATTACKS: Primary | ICD-10-CM

## 2024-05-16 RX ORDER — ALPRAZOLAM 1 MG/1
1 TABLET ORAL 4 TIMES DAILY PRN
Qty: 120 TABLET | Refills: 0 | Status: SHIPPED | OUTPATIENT
Start: 2024-05-16

## 2024-05-16 NOTE — PROGRESS NOTES
"Assessment/Plan:    Panic attacks  Pt feels needs more than 4mg benzo/day--MD will not Rx will add metoprolol     Diagnoses and all orders for this visit:    Panic attacks          Subjective:   Chief Complaint   Patient presents with    Medication Management        Patient ID: Teo Ward is a 29 y.o. male.    HPI    The following portions of the patient's history were reviewed and updated as appropriate: allergies, current medications, past family history, past medical history, past social history, past surgical history and problem list.    Review of Systems   Constitutional:  Negative for fatigue, fever and unexpected weight change.   HENT:  Negative for congestion, sinus pain and sore throat.    Eyes:  Negative for visual disturbance.   Respiratory:  Negative for shortness of breath and wheezing.    Cardiovascular:  Negative for chest pain and palpitations.   Gastrointestinal:  Negative for abdominal pain, nausea and vomiting.   Musculoskeletal: Negative.  Negative for arthralgias and myalgias.   Neurological:  Negative for syncope, weakness and numbness.   Psychiatric/Behavioral: Negative.  Negative for confusion, dysphoric mood and suicidal ideas.          Objective:  Vitals:    05/16/24 1545   BP: 138/88   Pulse: 84   Resp: 18   Temp: (!) 96.8 °F (36 °C)   TempSrc: Tympanic   SpO2: 99%   Weight: 79.8 kg (176 lb)   Height: 5' 10\" (1.778 m)      Physical Exam  Constitutional:       Appearance: He is well-developed.   HENT:      Right Ear: Ear canal normal. Tympanic membrane is not injected.      Left Ear: Ear canal normal. Tympanic membrane is not injected.      Nose: Nose normal.   Eyes:      General:         Right eye: No discharge.         Left eye: No discharge.      Conjunctiva/sclera: Conjunctivae normal.      Pupils: Pupils are equal, round, and reactive to light.   Neck:      Thyroid: No thyromegaly.   Cardiovascular:      Rate and Rhythm: Normal rate and regular rhythm.      Heart sounds: Normal " heart sounds. No murmur heard.  Pulmonary:      Effort: Pulmonary effort is normal. No respiratory distress.      Breath sounds: Normal breath sounds. No wheezing.   Abdominal:      General: Bowel sounds are normal. There is no distension.      Palpations: Abdomen is soft.      Tenderness: There is no abdominal tenderness.   Musculoskeletal:         General: Normal range of motion.      Cervical back: Normal range of motion and neck supple.   Lymphadenopathy:      Cervical: No cervical adenopathy.   Skin:     General: Skin is warm and dry.   Neurological:      Mental Status: He is alert and oriented to person, place, and time. He is not disoriented.      Sensory: No sensory deficit.      Motor: No weakness.      Coordination: Coordination normal.      Gait: Gait normal.      Deep Tendon Reflexes: Reflexes are normal and symmetric.   Psychiatric:         Speech: Speech normal.         Behavior: Behavior normal.         Thought Content: Thought content normal.         Judgment: Judgment normal.

## 2024-05-27 DIAGNOSIS — F41.0 PANIC ATTACKS: ICD-10-CM

## 2024-05-27 RX ORDER — ALPRAZOLAM 1 MG/1
1 TABLET ORAL 4 TIMES DAILY PRN
Qty: 120 TABLET | Refills: 0 | Status: SHIPPED | OUTPATIENT
Start: 2024-05-27

## 2024-06-16 DIAGNOSIS — F41.0 PANIC ATTACKS: ICD-10-CM

## 2024-06-17 RX ORDER — ALPRAZOLAM 1 MG/1
1 TABLET ORAL 4 TIMES DAILY PRN
Qty: 120 TABLET | Refills: 5 | Status: SHIPPED | OUTPATIENT
Start: 2024-06-24

## 2024-07-15 DIAGNOSIS — F41.0 PANIC ATTACKS: ICD-10-CM

## 2024-07-15 NOTE — TELEPHONE ENCOUNTER
Patient will need a medication refill.  Patient does not have enough medication for his trip.  He is going out of the country for few weeks.  Patient is leaving tomorrow evening.      Pharmacy said he would need an override for the trip.

## 2024-07-16 RX ORDER — ALPRAZOLAM 1 MG/1
1 TABLET ORAL 4 TIMES DAILY PRN
Qty: 120 TABLET | Refills: 5 | OUTPATIENT
Start: 2024-07-23

## 2024-07-16 RX ORDER — ALPRAZOLAM 1 MG/1
1 TABLET ORAL 4 TIMES DAILY PRN
Qty: 120 TABLET | Refills: 5 | Status: SHIPPED | OUTPATIENT
Start: 2024-07-23 | End: 2024-07-28 | Stop reason: SDUPTHER

## 2024-07-16 NOTE — TELEPHONE ENCOUNTER
Pt called to check status of his refill for  ALPRAZolam (XANAX) 1 mg tablet. He will be leaving shortly to go out of country.      [facility]acy: [id number] [place] [facility] [address] [facility]    Please call pt when script sent or if any questions.

## 2024-07-16 NOTE — TELEPHONE ENCOUNTER
Patient called again regarding his medication refill.  Medication was sent in to the pharmacy, but it can not be released until 07/23. The patient is supposed to leave in two hours for a trip out of the country.  He initially called yesterday, but the script wasn't;t sent in until today.  The patient is extremely upset that this has happened.  He would like a call back when the medication request is corrected.

## 2024-07-16 NOTE — TELEPHONE ENCOUNTER
Patient called again, wanting to receive medication before it is due. As per Lydia she viewed the PDMP and he cannot get the Xanax before 7/23/24.

## 2024-07-16 NOTE — TELEPHONE ENCOUNTER
Patient was following up on his Xanax.  Told him that script is received by pharmacy at 1:27 pm today. He will call pharmacy after 2:00 and will call us again if he has any problem.  Thank you!!

## 2024-07-16 NOTE — TELEPHONE ENCOUNTER
"Pt called in to the office wanting tog et xanax refilled early, spoke with pharmacy, per Lydia. 30 days would be on the 23rd when it will be eligible for a refill again. CAN\"T GET MEDICATION BEFORE THEN.   "

## 2024-07-16 NOTE — TELEPHONE ENCOUNTER
Patient called back to check on reason he cannot get the medication for his trip. Medication was sent to pharmacy but he can't pick it up before his trip.

## 2024-07-22 NOTE — TELEPHONE ENCOUNTER
Patient called in following up on his alprazolam medication. I did explain to him that this cannot be filled until tomorrow (7/23). Patient would like to know why this cannot be filled two days prior as his other medications can be filled. Patient has had to cancel multiple flights for work due to not having this medication.     # [phone]

## 2024-07-23 ENCOUNTER — TELEPHONE (OUTPATIENT)
Age: 29
End: 2024-07-23

## 2024-07-23 NOTE — TELEPHONE ENCOUNTER
Patient called stating he needs a prior auth for his ALPRAZolam (XANAX) 1 mg tablet.  He said he had to pay cash for it at the pharmacy and the pharmacy told him to get a pre auth from PCP.  Also, has new insurance and PEP \was having an issue with the insurance.  Called office and soft transferred to [facility].

## 2024-07-26 ENCOUNTER — TELEPHONE (OUTPATIENT)
Age: 29
End: 2024-07-26

## 2024-07-26 NOTE — TELEPHONE ENCOUNTER
Patient called back , he's running out of meds , xanax, patient was told he can see Dr cabezas today, booked an emergency flight from [place] and could not be seen. Patient was upset advised he'll have to wait until Dr cabezas returns

## 2024-07-26 NOTE — TELEPHONE ENCOUNTER
Patient flew back from [place] to have Virtual Visit with Dr. Lentz. Dr. eLntz is not in the office. He is stating that he was told to come back to see Dr. Lentz.    Please help the patient if you can.  Thank you!!

## 2024-07-28 DIAGNOSIS — F41.0 PANIC ATTACKS: ICD-10-CM

## 2024-07-28 RX ORDER — ALPRAZOLAM 1 MG/1
1 TABLET ORAL 4 TIMES DAILY PRN
Qty: 142 TABLET | Refills: 0 | Status: SHIPPED | OUTPATIENT
Start: 2024-07-28 | End: 2024-07-29 | Stop reason: SDUPTHER

## 2024-07-29 ENCOUNTER — TELEPHONE (OUTPATIENT)
Age: 29
End: 2024-07-29

## 2024-07-29 DIAGNOSIS — F41.0 PANIC ATTACKS: ICD-10-CM

## 2024-07-29 RX ORDER — ALPRAZOLAM 1 MG/1
1 TABLET ORAL 4 TIMES DAILY PRN
Qty: 142 TABLET | Refills: 0 | Status: CANCELLED | OUTPATIENT
Start: 2024-07-29

## 2024-07-29 RX ORDER — LORAZEPAM 1 MG/1
1 TABLET ORAL 4 TIMES DAILY
Qty: 142 TABLET | Refills: 1 | Status: SHIPPED | OUTPATIENT
Start: 2024-07-29

## 2024-07-29 RX ORDER — LORAZEPAM 1 MG/1
1 TABLET ORAL 4 TIMES DAILY
Qty: 142 TABLET | Refills: 1 | Status: SHIPPED | OUTPATIENT
Start: 2024-07-29 | End: 2024-07-29 | Stop reason: SDUPTHER

## 2024-07-29 RX ORDER — ALPRAZOLAM 1 MG/1
1 TABLET ORAL 4 TIMES DAILY PRN
Qty: 142 TABLET | Refills: 0 | Status: SHIPPED | OUTPATIENT
Start: 2024-07-29 | End: 2024-07-29 | Stop reason: SDUPTHER

## 2024-07-29 RX ORDER — ALPRAZOLAM 1 MG/1
1 TABLET ORAL 4 TIMES DAILY PRN
Qty: 142 TABLET | Refills: 0 | Status: SHIPPED | OUTPATIENT
Start: 2024-07-29 | End: 2024-07-29

## 2024-07-29 NOTE — TELEPHONE ENCOUNTER
Patient asking for Dr Lentz to change medication to lorazepam, as the pharmacies are out of Xanax.     [facility] [phone]

## 2024-07-29 NOTE — TELEPHONE ENCOUNTER
Patient requesting different pharmacy as the one it was sent to is out of medication.       Medication: ALPRAZolam (XANAX) 1 mg tablet     Dose/Frequency: Take 1 tablet (1 mg total) by mouth 4 (four) times a day as needed for anxiety     Quantity: 142 tablet     Pharmacy: [facility]     Office:   [x] PCP/Provider - Tor   [] Speciality/Provider -     Does the patient have enough for 3 days?   [] Yes   [x] No - Send as HP to POD

## 2024-07-29 NOTE — TELEPHONE ENCOUNTER
Meche from [facility] pharmacy called to notify that this patient is no longer their patient. She stated that patient is calling them and cursing them. Please reach out to the patient to sort out which pharmacy prescription needs to be sent.  Thank you!!

## 2024-07-29 NOTE — TELEPHONE ENCOUNTER
Received call from [facility] pharmacy to report they will not fill prescription for Alprazolam. Patient just had filled at [facility] 120 pills 7/23/24. They would like to speak to Dr. Lentz regarding circumstances. Pharmacy phone number is [phone].

## 2024-08-26 ENCOUNTER — TELEPHONE (OUTPATIENT)
Age: 29
End: 2024-08-26

## 2024-08-26 NOTE — TELEPHONE ENCOUNTER
Pharmacy called to notify that patient over the past few months has been consistently trying to get Benzodiazepine prescriptions filled early. He has filled complaint that pharmacy has shorted him medication. Pharmacy is calling to suggest a maintenance medication be considered for patient.

## 2024-08-29 NOTE — TELEPHONE ENCOUNTER
MD Karely Reynolds MA   Notify pharm we have tried mult maint meds and constantly try to control pts benzo use----he is a challenging pt to say the least     [facility] to Mirna James and at [facility] -- advised as per Dr. Lentz.  At this point they are going to have the patient count out the meds every time he picks them up at the store to verify the count in front of them.  Also they will not fill anything for him earlier than 2 days.

## 2024-09-19 DIAGNOSIS — F41.0 PANIC ATTACKS: ICD-10-CM

## 2024-09-20 RX ORDER — LORAZEPAM 1 MG/1
1 TABLET ORAL 4 TIMES DAILY
Qty: 142 TABLET | OUTPATIENT
Start: 2024-09-20

## 2024-09-20 NOTE — TELEPHONE ENCOUNTER
Last fill per pdmp 8/31/2024.   Release date set for 9/28/2024.  Please advise on message to pharmacy and dosing if appropriate.

## 2024-09-22 RX ORDER — LORAZEPAM 1 MG/1
1 TABLET ORAL 4 TIMES DAILY
Qty: 142 TABLET | Refills: 1 | OUTPATIENT
Start: 2024-09-28

## 2024-10-21 DIAGNOSIS — F41.9 ANXIETY: Primary | ICD-10-CM

## 2024-10-21 RX ORDER — ALPRAZOLAM 1 MG/1
1 TABLET ORAL 4 TIMES DAILY PRN
Qty: 120 TABLET | Refills: 2 | Status: SHIPPED | OUTPATIENT
Start: 2024-10-21

## 2024-10-24 ENCOUNTER — NURSE TRIAGE (OUTPATIENT)
Dept: OTHER | Facility: OTHER | Age: 29
End: 2024-10-24

## 2024-10-24 NOTE — TELEPHONE ENCOUNTER
"Regarding: Lost medication  ----- Message from Marlene CORBETT sent at 10/24/2024  6:30 PM EDT -----  \" I lost my medication Xanax. I am having withdrawal symptoms, I am having tremors and twitches\"    "

## 2024-10-24 NOTE — TELEPHONE ENCOUNTER
Patient states he does not want to go to the Emergency Department as he doesn't have insurance; asking if a few pills can be sent to the pharmacy at this time. Advised patient that this type of medication is not typically filled by after hours service. Epic SC message sent to on-call provider at this time.

## 2024-10-24 NOTE — TELEPHONE ENCOUNTER
"Reason for Disposition  • Patient sounds very sick or weak to the triager    Answer Assessment - Initial Assessment Questions  1. DRUG: \"What drug(s) are you using?\"       Xanax    2. ROUTE: \"How are you using this drug?\" (e.g., swallow, smoke, inject, cristobal, snort)      oral    3. HOW OFTEN: \"How many days per week do you typically use it?\"      Daily    4.  HOW MUCH: \"How much do you use?\"       4mg     5. LAST 24 HOURS: \"Have you used it in the last 24 hours?\"      Denies; states last dose was 24 hours ago    6. DRINKING PROBLEM: \"Do you have or have you ever had an alcohol problem?\"      N/A    7. SYMPTOMS: \"What symptoms are you currently experiencing?\" (e.g., none, headache, chest pain, abdominal pain, vomiting)      Tremors - 6/10; denies chest pain, headache, abdominal pain, vomiting    8. DETOX PROGRAM: \"Have you ever gone through a substance abuse (detox) program?\"      N/A    9. THERAPIST: \"Do you have a counselor or therapist? Name?\"        10. SUPPORT: \"Who is with you now?\" \"Who do you live with?\" \"Do you have family or friends nearby who you can talk to?\"    Patient is currently with his girlfriend    States that he picked his prescription up on 10/21; asking if he can have a few pills until he can follow up with the doctor.    Protocols used: Substance Abuse and Dependence-ADULT-AH    "

## 2024-10-24 NOTE — TELEPHONE ENCOUNTER
Per on-call provider, controlled substances can't be sent early. If patient is having symptoms of withdrawal, he needs to go to the Emergency Department.    Patient would like follow up from the office tomorrow.

## 2024-10-25 ENCOUNTER — TELEPHONE (OUTPATIENT)
Dept: FAMILY MEDICINE CLINIC | Facility: CLINIC | Age: 29
End: 2024-10-25

## 2024-10-25 NOTE — TELEPHONE ENCOUNTER
PDMP reviewed, last filled 10/21/2024 quantity of 120.    Patient requested call back from office, please advise

## 2024-10-25 NOTE — TELEPHONE ENCOUNTER
Patient called in for status update on prior request for additional medication. I was able to x-mary ann patient to Fe in office.
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
## 2024-12-20 DIAGNOSIS — F41.9 ANXIETY: ICD-10-CM

## 2024-12-20 RX ORDER — ALPRAZOLAM 1 MG/1
1 TABLET ORAL 4 TIMES DAILY PRN
Qty: 120 TABLET | Refills: 0 | Status: CANCELLED | OUTPATIENT
Start: 2024-12-20

## 2024-12-22 ENCOUNTER — NURSE TRIAGE (OUTPATIENT)
Dept: OTHER | Facility: OTHER | Age: 29
End: 2024-12-22

## 2024-12-22 NOTE — TELEPHONE ENCOUNTER
"Reason for Disposition  • Patient sounds very sick or weak to the triager    Answer Assessment - Initial Assessment Questions  1. DRUG NAME: \"What medicine do you need to have refilled?\"      Xanax     2. REFILLS REMAINING: \"How many refills are remaining?\" (Note: The label on the medicine or pill bottle will show how many refills are remaining. If there are no refills remaining, then a renewal may be needed.)      None    3. EXPIRATION DATE: \"What is the expiration date?\" (Note: The label states when the prescription will , and thus can no longer be refilled.)      N/a    4. PRESCRIBING HCP: \"Who prescribed it?\" Reason: If prescribed by specialist, call should be referred to that group.      Last filled by Dr. Lentz    5. SYMPTOMS: \"Do you have any symptoms?\"      Pt asking for refill on xanax. States he feels he is going into withdrawal since he has not had the Xanax since . States he had a seizure which he was seen for today in ED at [facility].    Protocols used: Medication Refill and Renewal Call-Adult-, Substance Use and Problems-Adult-      Recommended pt seek care in ED if concerned about ongoing withdrawal symptoms and recent seizure. States he was already seen today in the ER at [facility] and is not going to go to the ER.   Explained to pt that unfortunately controlled medications cannot be filled after hrs and pt would need to address his concerns with his PCP. Pt states he is no longer pt with Dr. Lentz but looking for one final refill. States he does not understand anything about a breach of contract with his medications and states that this is false. Reiterated to pt once again that ED evaluation is the best and safest measure at this time for him. Pt stated that he will not go to the ER and will just end up buying the Xanax from the street. Made pt aware that information would be forwarded to his office but again cannot change recommendation for ER evaluation if pt understands " possible risks he is putting himself in. Pt stated that he does not want any information that he verbalized (about buying medication in the street) forwarded to Dr. Lentz. Explained to pt that calls are recorded. Pt then became belligerent and started using profanities- demanding again that he is telling this [facility] RN  to not send this information to Dr. Lentz. Pt disconnected call while yelling profanities.
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
## 2024-12-23 NOTE — TELEPHONE ENCOUNTER
Patient called requesting a call back from Dr. Lentz would like to speak with him regarding breach of contract and on going medication management.      Please review.  Thank you

## 2025-01-14 ENCOUNTER — TELEPHONE (OUTPATIENT)
Dept: FAMILY MEDICINE CLINIC | Facility: CLINIC | Age: 30
End: 2025-01-14

## 2025-01-14 NOTE — TELEPHONE ENCOUNTER
[address]   [place] [address]   [phone]  Mack Scott   [address] [facility] [address] [facility]   [address]   [place] [address]   [phone] (Work)   [phone] (Fax)    Patient has established with new PCP, please remove Dr Lentz.

## 2025-01-15 DIAGNOSIS — F41.9 ANXIETY: ICD-10-CM

## 2025-01-16 RX ORDER — ALPRAZOLAM 1 MG/1
TABLET ORAL
Qty: 120 TABLET | OUTPATIENT
Start: 2025-01-16

## 2025-01-16 NOTE — TELEPHONE ENCOUNTER
Pt is suffering from seizures last one happened 3 days ago, having ticks stated he needs a script to help with the seizures and ticks, pt has been to the ER multiple times.     [id number] - [place] - [address] [facility] [facility]    Pt is looking for different doctor

## 2025-01-31 NOTE — TELEPHONE ENCOUNTER
01/30/25 11:29 PM    Hello.  The new [facility] PCP will be added to the patient's chart when they arrive for their first appointment with the office.     Thank you.  Redd Gaona

## 2025-02-03 NOTE — TELEPHONE ENCOUNTER
----- Message from Cy Ruiz MD sent at 8/23/2021  2:34 PM CDT -----  nl   Patient No Showed for Visit with new [facility] PCP. Please advise on patient attribution.

## 2025-02-26 NOTE — TELEPHONE ENCOUNTER
02/25/25 8:51 PM        The office's request has been received, reviewed, and the patient chart updated. The PCP has successfully been removed with a patient attribution note. This message will now be completed.        Thank you  Redd Gaona

## 2025-07-05 ENCOUNTER — APPOINTMENT (EMERGENCY)
Dept: RADIOLOGY | Facility: HOSPITAL | Age: 30
End: 2025-07-05
Attending: EMERGENCY MEDICINE

## 2025-07-05 ENCOUNTER — HOSPITAL ENCOUNTER (EMERGENCY)
Facility: HOSPITAL | Age: 30
Discharge: PSYCHIATRIC OR SUBSTANCE USE FACILITY | End: 2025-07-06
Attending: EMERGENCY MEDICINE | Admitting: EMERGENCY MEDICINE

## 2025-07-05 DIAGNOSIS — R40.0 SOMNOLENCE: ICD-10-CM

## 2025-07-05 DIAGNOSIS — F19.10 SUBSTANCE ABUSE (CMS/HCC): ICD-10-CM

## 2025-07-05 DIAGNOSIS — T50.901A ACCIDENTAL DRUG OVERDOSE, INITIAL ENCOUNTER: Primary | ICD-10-CM

## 2025-07-05 DIAGNOSIS — R00.1 BRADYCARDIA: ICD-10-CM

## 2025-07-05 LAB
ALBUMIN SERPL-MCNC: 4.4 G/DL (ref 3.5–5.7)
ALP SERPL-CCNC: 65 IU/L (ref 34–125)
ALT SERPL-CCNC: 18 IU/L (ref 7–52)
ANION GAP SERPL CALC-SCNC: 6 MEQ/L (ref 3–15)
APAP SERPL-MCNC: 2.4 UG/ML (ref 10–30)
AST SERPL-CCNC: 21 IU/L (ref 13–39)
BASOPHILS # BLD: 0.02 K/UL (ref 0.01–0.1)
BASOPHILS NFR BLD: 0.4 %
BILIRUB SERPL-MCNC: 0.3 MG/DL (ref 0.3–1.2)
BUN SERPL-MCNC: 6 MG/DL (ref 7–25)
CALCIUM SERPL-MCNC: 9.6 MG/DL (ref 8.6–10.3)
CHLORIDE SERPL-SCNC: 107 MEQ/L (ref 98–107)
CO2 SERPL-SCNC: 28 MEQ/L (ref 21–31)
CREAT SERPL-MCNC: 0.9 MG/DL (ref 0.7–1.3)
DIFFERENTIAL METHOD BLD: ABNORMAL
EGFRCR SERPLBLD CKD-EPI 2021: >60 ML/MIN/1.73M*2
EOSINOPHIL # BLD: 0.08 K/UL (ref 0.04–0.54)
EOSINOPHIL NFR BLD: 1.6 %
ERYTHROCYTE [DISTWIDTH] IN BLOOD BY AUTOMATED COUNT: 12.5 % (ref 11.6–14.4)
ETHANOL SERPL-MCNC: <10 MG/DL
GLUCOSE SERPL-MCNC: 159 MG/DL (ref 70–99)
HCT VFR BLD AUTO: 35.3 % (ref 40.1–51)
HGB BLD-MCNC: 11.8 G/DL (ref 13.7–17.5)
IMM GRANULOCYTES # BLD AUTO: 0.02 K/UL (ref 0–0.08)
IMM GRANULOCYTES NFR BLD AUTO: 0.4 %
LYMPHOCYTES # BLD: 1.69 K/UL (ref 1.2–3.5)
LYMPHOCYTES NFR BLD: 34.6 %
MCH RBC QN AUTO: 28.3 PG (ref 28–33.2)
MCHC RBC AUTO-ENTMCNC: 33.4 G/DL (ref 32.2–36.5)
MCV RBC AUTO: 84.7 FL (ref 83–98)
MONOCYTES # BLD: 0.28 K/UL (ref 0.3–1)
MONOCYTES NFR BLD: 5.7 %
NEUTROPHILS # BLD: 2.79 K/UL (ref 1.7–7)
NEUTS SEG NFR BLD: 57.3 %
NRBC BLD-RTO: 0 %
PLATELET # BLD AUTO: 194 K/UL (ref 150–350)
PMV BLD AUTO: 8.9 FL (ref 9.4–12.4)
POTASSIUM SERPL-SCNC: 4 MEQ/L (ref 3.5–5.1)
PROT SERPL-MCNC: 6.9 G/DL (ref 6–8.2)
RBC # BLD AUTO: 4.17 M/UL (ref 4.5–5.8)
SALICYLATES SERPL-MCNC: <1.5 MG/DL
SODIUM SERPL-SCNC: 141 MEQ/L (ref 136–145)
TROPONIN I SERPL HS-MCNC: <2 PG/ML
WBC # BLD AUTO: 4.88 K/UL (ref 3.8–10.5)

## 2025-07-05 PROCEDURE — 93005 ELECTROCARDIOGRAM TRACING: CPT | Performed by: EMERGENCY MEDICINE

## 2025-07-05 PROCEDURE — 85025 COMPLETE CBC W/AUTO DIFF WBC: CPT | Performed by: EMERGENCY MEDICINE

## 2025-07-05 PROCEDURE — 96360 HYDRATION IV INFUSION INIT: CPT

## 2025-07-05 PROCEDURE — 36415 COLL VENOUS BLD VENIPUNCTURE: CPT | Performed by: EMERGENCY MEDICINE

## 2025-07-05 PROCEDURE — 25800000 HC PHARMACY IV SOLUTIONS: Performed by: EMERGENCY MEDICINE

## 2025-07-05 PROCEDURE — 99284 EMERGENCY DEPT VISIT MOD MDM: CPT | Mod: 25

## 2025-07-05 PROCEDURE — 80053 COMPREHEN METABOLIC PANEL: CPT | Performed by: EMERGENCY MEDICINE

## 2025-07-05 PROCEDURE — 84484 ASSAY OF TROPONIN QUANT: CPT | Performed by: EMERGENCY MEDICINE

## 2025-07-05 PROCEDURE — G0480 DRUG TEST DEF 1-7 CLASSES: HCPCS | Performed by: EMERGENCY MEDICINE

## 2025-07-05 PROCEDURE — 71045 X-RAY EXAM CHEST 1 VIEW: CPT

## 2025-07-05 RX ORDER — TRAZODONE HYDROCHLORIDE 50 MG/1
TABLET ORAL NIGHTLY
COMMUNITY

## 2025-07-05 RX ORDER — LEVETIRACETAM 500 MG/1
500 TABLET ORAL 2 TIMES DAILY
COMMUNITY

## 2025-07-05 RX ORDER — TALC
3 POWDER (GRAM) TOPICAL NIGHTLY
COMMUNITY

## 2025-07-05 RX ORDER — PHENOBARBITAL 32.4 MG/1
32.4 TABLET ORAL 2 TIMES DAILY
COMMUNITY

## 2025-07-05 RX ORDER — SENNOSIDES 8.6 MG/1
1 TABLET ORAL DAILY
COMMUNITY

## 2025-07-05 RX ORDER — DICYCLOMINE HYDROCHLORIDE 20 MG/1
20 TABLET ORAL
COMMUNITY

## 2025-07-05 RX ORDER — DIAZEPAM 5 MG/1
10 TABLET ORAL EVERY 6 HOURS PRN
COMMUNITY

## 2025-07-05 RX ORDER — CLONIDINE HYDROCHLORIDE 0.1 MG/1
0.1 TABLET ORAL 2 TIMES DAILY
COMMUNITY

## 2025-07-05 RX ORDER — IBUPROFEN 200 MG
200 TABLET ORAL EVERY 6 HOURS PRN
COMMUNITY

## 2025-07-05 RX ORDER — ACETAMINOPHEN 325 MG/1
TABLET ORAL EVERY 6 HOURS PRN
COMMUNITY

## 2025-07-05 RX ORDER — DOCUSATE SODIUM 100 MG/1
100 CAPSULE, LIQUID FILLED ORAL 2 TIMES DAILY
COMMUNITY

## 2025-07-05 RX ADMIN — SODIUM CHLORIDE 1000 ML: 9 INJECTION, SOLUTION INTRAVENOUS at 22:47

## 2025-07-06 VITALS
DIASTOLIC BLOOD PRESSURE: 77 MMHG | SYSTOLIC BLOOD PRESSURE: 153 MMHG | OXYGEN SATURATION: 100 % | TEMPERATURE: 98.2 F | RESPIRATION RATE: 18 BRPM | HEART RATE: 50 BPM

## 2025-07-06 LAB
ATRIAL RATE: 39
P AXIS: 29
PR INTERVAL: 180
QRS DURATION: 92
QT INTERVAL: 508
QTC CALCULATION(BAZETT): 408
R AXIS: 2
T WAVE AXIS: 10
TROPONIN I SERPL HS-MCNC: 3 PG/ML
VENTRICULAR RATE: 39

## 2025-07-06 PROCEDURE — 84484 ASSAY OF TROPONIN QUANT: CPT | Performed by: EMERGENCY MEDICINE

## 2025-07-06 PROCEDURE — 93010 ELECTROCARDIOGRAM REPORT: CPT | Performed by: INTERNAL MEDICINE

## 2025-07-06 PROCEDURE — 36415 COLL VENOUS BLD VENIPUNCTURE: CPT | Performed by: EMERGENCY MEDICINE

## 2025-07-06 PROCEDURE — 96361 HYDRATE IV INFUSION ADD-ON: CPT

## 2025-07-06 NOTE — DISCHARGE INSTRUCTIONS
Patient was seen here today for concerns for drug overdose on our exam he is otherwise well-appearing he has been observed here to sobriety he has no signs of significant metabolic or acute traumatic abnormalities he is otherwise safe and stable for discharge back to [facility]

## 2025-07-06 NOTE — ED PROVIDER NOTES
"Emergency Medicine Note  HPI   HISTORY OF PRESENT ILLNESS     The patient is a 30-year-old male with past medical history of substance abuse who presented to the emergency department for evaluation status post overdose.  The patient was at [facility] when he was witnessed to have taken an overdose of unknown drug.  The patient then became unresponsive.  He was administered a total of 12 mg of Narcan 8 intranasal and 4 IM.  Patient was somnolent on arrival but arouses to stimuli.  Patient does verbalize at times.  Limited exam and history due to altered mental status, poor cooperation.  No known history of traumatic injury.  No reported vomiting.  No history of recent fever.                Patient History   PAST HISTORY     Reviewed from Nursing Triage:       Past Medical History:   Diagnosis Date   • Substance abuse (CMS/HCC)        No past surgical history on file.    No family history on file.    Social History     Tobacco Use   • Smoking status: Smoker, Current Status Unknown   • Smokeless tobacco: Never   Substance Use Topics   • Alcohol use: Yes   • Drug use: Yes     Types: Heroin     Comment: pt states \"I'm trying to quit so I came here\".          Review of Systems   REVIEW OF SYSTEMS     Review of Systems   Unable to perform ROS: Mental status change         VITALS     ED Vitals      Date/Time Temp Pulse Resp BP SpO2 [facility]   07/06/25 0000 -- 40 28 145/95 98 %    07/05/25 2331 37.8 °C (100 °F) 40 28 165/94 97 %    07/05/25 2251 -- 45 20 130/98 100 %    07/05/25 2218 36.8 °C (98.2 °F) 44 43 141/93 99 %    07/05/25 2210 -- 43 -- 138/90 -- [facility]   07/05/25 2204 -- 48 10 -- 97 % [facility]                         Physical Exam   PHYSICAL EXAM     Physical Exam  Vitals and nursing note reviewed.   Constitutional:       General: He is not in acute distress.     Appearance: He is well-developed. He is not diaphoretic.      Comments: Patient appears pale, decreased responsiveness lying in bed.   HENT:      Head: Normocephalic " and atraumatic.      Nose: Nose normal.      Mouth/Throat:      Mouth: Mucous membranes are moist.      Pharynx: Oropharynx is clear.   Eyes:      Extraocular Movements: Extraocular movements intact.      Conjunctiva/sclera: Conjunctivae normal.      Pupils: Pupils are equal, round, and reactive to light.   Neck:      Vascular: No JVD.   Cardiovascular:      Rate and Rhythm: Regular rhythm. Bradycardia present.      Pulses: Normal pulses.      Heart sounds: Normal heart sounds. No murmur heard.  Pulmonary:      Effort: Pulmonary effort is normal. No respiratory distress.      Breath sounds: Normal breath sounds. No stridor. No wheezing or rales.   Chest:      Chest wall: No tenderness.   Abdominal:      General: Bowel sounds are normal. There is no distension.      Palpations: Abdomen is soft. There is no mass.      Tenderness: There is no abdominal tenderness. There is no guarding or rebound.   Genitourinary:     Penis: Normal.    Musculoskeletal:         General: No tenderness or signs of injury. Normal range of motion.      Cervical back: Normal range of motion and neck supple.   Skin:     General: Skin is warm and dry.      Capillary Refill: Capillary refill takes less than 2 seconds.      Coloration: Skin is pale.      Findings: No rash.   Neurological:      Comments: Patient's somnolent.  Arouses to voice and painful stimuli.  Limited exam due to poor cooperation.  Patient moves all extremities.   Psychiatric:      Comments: Somnolent.           PROCEDURES     Critical Care    Performed by: Kingston Calvo MD  Authorized by: Kingston Calvo MD    Critical care provider statement:     Critical care time (minutes):  60    Critical care time was exclusive of:  Separately billable procedures and treating other patients    Critical care was necessary to treat or prevent imminent or life-threatening deterioration of the following conditions:  CNS failure or compromise    Critical care was time spent personally  by me on the following activities:  Ordering and performing treatments and interventions, ordering and review of laboratory studies, ordering and review of radiographic studies, pulse oximetry, re-evaluation of patient's condition, development of treatment plan with patient or surrogate, evaluation of patient's response to treatment, examination of patient and obtaining history from patient or surrogate    I assumed direction of critical care for this patient from another provider in my specialty: no         DATA     Results       Procedure Component Value Units Date/Time    HS Troponin (with 2 hour reflex) [[id number]]  (Normal) Collected: 07/05/25 2211    Specimen: Blood, Venous Updated: 07/05/25 2318     High Sens Troponin I <2.0 pg/mL     ER toxicology screen, serum [[id number]]  (Abnormal) Collected: 07/05/25 2211    Specimen: Blood, Venous Updated: 07/05/25 2249     Salicylate <1.5 mg/dL      Acetaminophen 2.4 ug/mL      Ethanol <10 mg/dL     Comprehensive metabolic panel [[id number]]  (Abnormal) Collected: 07/05/25 2211    Specimen: Blood, Venous Updated: 07/05/25 2245     Sodium 141 mEQ/L      Potassium 4.0 mEQ/L      Comment: Results obtained on plasma. Plasma Potassium values may be up to 0.4 mEQ/L less than serum values. The differences may be greater for patients with high platelet or white cell counts.        Chloride 107 mEQ/L      CO2 28 mEQ/L      BUN 6 mg/dL      Creatinine 0.9 mg/dL      Glucose 159 mg/dL      Calcium 9.6 mg/dL      AST (SGOT) 21 IU/L      ALT (SGPT) 18 IU/L      Alkaline Phosphatase 65 IU/L      Total Protein 6.9 g/dL      Comment: Test performed on plasma which typically contains approximately 0.4 g/dL more protein than serum.        Albumin 4.4 g/dL      Bilirubin, Total 0.3 mg/dL      eGFR >60.0 mL/min/1.73m*2      Comment: Calculation based on the Chronic Kidney Disease Epidemiology Collaboration (CKD-EPI) equation refit without adjustment for race.        Anion Gap 6 mEQ/L     CBC  and differential [[id number]]  (Abnormal) Collected: 07/05/25 2211    Specimen: Blood, Venous Updated: 07/05/25 2230     WBC 4.88 K/uL      RBC 4.17 M/uL      Hemoglobin 11.8 g/dL      Hematocrit 35.3 %      MCV 84.7 fL      MCH 28.3 pg      MCHC 33.4 g/dL      RDW 12.5 %      Platelets 194 K/uL      MPV 8.9 fL      Differential Type Auto     nRBC 0.0 %      Immature Granulocytes 0.4 %      Neutrophils 57.3 %      Lymphocytes 34.6 %      Monocytes 5.7 %      Eosinophils 1.6 %      Basophils 0.4 %      Immature Granulocytes, Absolute 0.02 K/uL      Neutrophils, Absolute 2.79 K/uL      Lymphocytes, Absolute 1.69 K/uL      Monocytes, Absolute 0.28 K/uL      Eosinophils, Absolute 0.08 K/uL      Basophils, Absolute 0.02 K/uL             Imaging Results              X-RAY CHEST 1 VIEW (Preliminary result)  Result time 07/05/25 23:03:18      Preliminary Interpretation    NAD                                    ECG 12 lead          Scoring tools                                  ED Course & MDM   MDM / ED COURSE / CLINICAL IMPRESSION / DISPO     Medical Decision Making  Patient arrived with decreased mental status after overdose.  Narcan given prior to arrival in the emergency department.  No history of traumatic injury.  Plan monitor patient.  Check labs, chest x-ray.    Amount and/or Complexity of Data Reviewed  Independent Historian: EMS     Details: No history of traumatic injury.  EMS reports staff witnessed the patient flushing substance down toilet.  Labs: ordered.  Radiology: ordered and independent interpretation performed.  ECG/medicine tests: ordered.        ED Course as of 07/06/25 0526   Sun Jul 06, 2025   0019 Patient  sleeping.  Vital signs reviewed.  No hypoxia.  No hypotension.  Bradycardia.  Plan observe patient. [MD]   0435 Patient is alert walking around wants to go back to [facility] denies any complaints at this time [RW]   0526 Patient is sitting upright has been walking back-and-forth in the bathroom requesting  to go back to [facility] [RW]      ED Course User Index  [MD] Kingston Calvo MD  [RW] Troy Frey MD     Clinical Impression      Accidental drug overdose, initial encounter   Bradycardia   Somnolence   Substance abuse (CMS/[facility])                 Kingston Calvo MD  07/06/25 0020